# Patient Record
(demographics unavailable — no encounter records)

---

## 2024-10-15 NOTE — HISTORY OF PRESENT ILLNESS
[FreeTextEntry6] : 16yo F presenting for routine Depo-Provera injection. Last Depo given on at [facility] on 06/05/24, no Depo received at outside facilities. No acute medical concerns at today's visit, no acute medical concerns. Patient is not currently sexually active, was last sexually 03/24.   Patient was previously booked to receive Depo on 08/27/24 at [facility], however could not attend because she had work and also felt the Depo shot was making her gain weight. Since discontinuation, she notes that she has continued to gain weight, and her period returned with severe cramping and heavy bleeding.  Patient states she has ongoing routine burning with urination, with one severe episode of painful urination in summer 2023, spontaneous resolution without seeking medical attention  As it has been >3mos since previous Depo shot, bUCG and sign consent to re-start.  Menstrual history: LMP 10/5/24, periods have re-started since gap in Depo, 5 days in duration, 8 pads per day at heaviest, associated symptoms include severe cramping and nausea. PMHx: Depression, previously taking Zoloft but self-discontinued in 3/24. Medications: None. Allergies: NKDA, NKA. Attends therapy regularly.   H: Lives with parents, 15yo sister, 13yo brother, and 11yo brother. Feels safe at home, supportive parents. E: Graduated high school, currently in college M/W/F studying psychology. Did not pay for this semester, wants  A: Enjoys times time with friends and family D: No drug use, no alcohol use. S: No current sexual activity. S: No self-harm, no SI, no thoughts of harming others.  Next Depo shot will be due on 01/07/25; please schedule CPE at this time as well.

## 2025-01-16 NOTE — HISTORY OF PRESENT ILLNESS
[FreeTextEntry1] : 18 year old female on Depo Provera for heavy menstrual bleeding and cramps presenting for Depo injection  - Last Depo injection was 10/15; window (Dec 31 - Jan 14) - 1/1 endorsed uterine bleeding x 6 days - Would like to continue the method however concerned about weight gain  HEADSS: Feels safe at home; Currently working, plans to resume at [employer]; Likes to work and sleep for dung; Denies vaping, cigarettes, heroin, cocaine, ecstasy, Adderall.  Last SA a year ago - not attracted to anyone;  previously SA - no current SI/HI/NSSI has a therapist may be starting medication.

## 2025-01-16 NOTE — ASSESSMENT
[FreeTextEntry1] : 18 year old female with a history of heavy menstrual bleeding and dysmenorrhea here for DMPA injection. Patient overall happy with method except for weight gain.    - POC pregnancy negative; the patient was outside window  - Administered DMPA injection today without any issues; next window (April 2nd - 16th) - Discussed options for alternate options of contraception for heavy menstrual bleeding and dysmenorrhea.  Patient may be interested in [facility] patches in the future.  Denies estrogen contraindications.   - Next visit will be annual well visit

## 2025-01-16 NOTE — REVIEW OF SYSTEMS
Discharge Instructions For Your Heart Catheterization/Stent with Radial/Wrist Site    Activity: For the next 24 hours  Follow these guidelines related to the sedation medicine that you've received.   You must have someone drive you home.  You may feel tired, unsteady, or not quite yourself for the remainder of the day due to the sedation medicine. Your body gets rid of the medicine usually in 24 hours.  Do not drive or operate machinery or power tools.  Do not drink alcohol or smoke.  Do not make any important decisions or sign important papers.    Activity:   Follow these guidelines related to the puncture site in your wrist.  Minimal use of the arm used for the procedure for the remainder of the day. If possible, elevate your arm on a pillow and don't bend your wrist.  No lifting more than 5 pounds for 5 days with the arm used for the procedure.  If you do heavy physical work on your job, follow your doctor's instructions about when you may return to work.  Use ice pack for discomfort.  If you have a wrist immobilizer, remove the following morning.    Procedure Site Care:  Keep the dressing on your procedure site for the remainder of the day. The following day, you may remove the dressing and shower. Gently cleanse the procedure site with soap and water daily and dry well.   Apply a new Band-aid on the puncture site for 1 day;  Do not apply lotions, ointments, powders, or creams to site.   No soaking the wrist in water (i.e., bathtub, hot tub, dish water, pool of water) until the wound has completely healed (3-5 days).    Common side effects you may notice  Soreness at puncture site.  Slight bruising at the site for several days to several weeks.  Lump the size of a pea or marble at the puncture site for a few weeks.    Diet/Fluids  Resume diet as prior to admission.  If you had a catheterization or stent, drink plenty of liquids to help flush the dye used for your procedure out of your body (unless you're on a  fluid restriction ordered by your physician).    Medications  Take mild pain reliever such as Tylenol/Acetaminophen as needed for pain.    Call your doctor with these issues  Bleeding from the procedure site. If significant bleeding occurs or there is a growing lump at your site, apply firm pressure at the site where your dressing is. Call 911 and keep pressure on the site.  Numbness, tingling or color change in the arm used for puncture site.  Increasing pain and firmness near the puncture site.  A temperature greater than 101 degrees.  Redness or drainage around site.      [Nl] : Integumentary [FreeTextEntry3] : Wt gain

## 2025-04-15 NOTE — HISTORY OF PRESENT ILLNESS
[Eats regular meals including adequate fruits and vegetables] : eats regular meals including adequate fruits and vegetables [Has friends] : has friends [Uses safety belts/safety equipment] : uses safety belts/safety equipment  [Yes] : Patient has had sexual intercourse. [Has ways to cope with stress] : has ways to cope with stress [Displays self-confidence] : displays self-confidence [NO] : No [Uses electronic nicotine delivery system] : does not use electronic nicotine delivery system [Uses tobacco] : does not use tobacco [Uses drugs] : does not use drugs  [Drinks alcohol] : does not drink alcohol [Impaired/distracted driving] : no impaired/distracted driving [FreeTextEntry7] : 17 year old Female currently on depo (last shot January 15, 2025). [de-identified] : Interested in switching to the patch because she gained a lot of weight on the shot. Depo got rid of her period completely which she likes. She originally started birth control due to severe cramps. [FreeTextEntry8] : Has not had a period since las appointment in January.  [de-identified] : Lives with mom, dad, 2 brothers and sister. Get along well and she feels safe at home [de-identified] : Plans to go to school next fall or spring semester. Wants do carpentry or mechanical engineering. Currently works as a  at [employer]. [de-identified] : Forgets to eat while working. Eats more vegetables than fruit. Healthy diet encouraged with smaller well rounded more frequent meals.  [de-identified] : Likes to go the park. Hard to find time exercising because of work schedule. [de-identified] : Last had sex a year or two ago. Typically engages in oral and vaginal with a male partner.  [de-identified] : Currently on Zoloft for depression and anxiety - sees a therapist weekly and psychiatrist- feels like they help.

## 2025-04-15 NOTE — REVIEW OF SYSTEMS
[Headache] : headache [Fever] : no fever [Chills] : no chills [Night Sweats] : no night sweats [Sore Throat] : no sore throat [Chest Pain] : no chest pain [Cough] : no cough [Vomiting] : no vomiting [Diarrhea] : no diarrhea [Constipation] : no constipation [Dizziness] : no dizziness [Myalgia] : no myalgia [Back Pain] : no back pain [Dysuria] : no dysuria [Vaginal Dischage] : no vaginal discharge

## 2025-04-15 NOTE — RISK ASSESSMENT
[1] : 2) Feeling down, depressed, or hopeless for several days (1) [Nearly Every Day (3)] : 7.) Trouble concentrating on things, such as reading a newspaper or watching television? Nearly every day [Several Days (1)] : 8.) Moving or speaking so slowly that other people could have noticed, or the opposite, moving or speaking faster than usual? Several days [Not at All (0)] : 9.) Thoughts that you would be off dead or of hurting yourself in some way? Not at all [Moderately Severe] : Severity of Depression is Moderately Severe [Somewhat Difficult] : How difficult have these problems made it for you to do your work, take care of things at home, or get along with people? Somewhat difficult [I have developed a follow-up plan documented below in the note.] : I have developed a follow-up plan documented below in the note. [PHQ-2 Negative - No further assessment needed] : PHQ-2 Negative - No further assessment needed [PHQ-9 Positive] : PHQ-9 Positive [de-identified] : Feels like the symptoms of sleeping/being tired, and appetite are related to work more than depression. [[id number]] : 2 [[id number]] : 15

## 2025-04-15 NOTE — PHYSICAL EXAM
[No Acute Distress] : no acute distress [Normocephalic] : normocephalic [EOMI Bilateral] : EOMI bilateral [Nonerythematous Oropharynx] : nonerythematous oropharynx [Clear to Auscultation Bilaterally] : clear to auscultation bilaterally [Regular Rate and Rhythm] : regular rate and rhythm [Normal S1, S2 audible] : normal S1, S2 audible [No Murmurs] : no murmurs [Soft] : soft [NonTender] : non tender [Non Distended] : non distended

## 2025-04-15 NOTE — DISCUSSION/SUMMARY
[FreeTextEntry1] : Patient is a 16yo F here for a well visit and interesting in switching birth control methods. She is currently on the depo shot (last administered January 15th) but would like to switch to the patch due to excess weight gain on the shot. Her PHQ-9 is positive for moderately severe depression. She is not currently endorsing any HI/SI/NSSI. She is currently on Zoloft and seeing a therapist weekly and a psychiatrist. She will continue to follow-up with them.  She will follow-up in 10 weeks to see how she is doing on the patch.

## 2025-06-13 NOTE — ASSESSMENT
[FreeTextEntry1] : Contraceptive Management : The patient is tolerating the birth control patch well. It has been effective in managing her menstrual symptoms, including reducing heavy bleeding and cramps. She has not experienced significant side effects, and her blood pressure remains within normal limits. The patient has also experienced weight loss since discontinuing Depo-Provera and starting the patch.  BP today within normal limits. - Therapeutic Interventions: Continue with the birth control patch. Prescribed a 6-month supply. - Patient Education: Discussed the continued use of the patch, its effectiveness, and potential side effects to monitor. - Monitoring: Continue to monitor blood pressure at future visits due to estrogen-containing contraception. - Follow-Up: Scheduled for a follow-up visit in 6 months. Patient instructed to return sooner if any concerns arise.

## 2025-06-13 NOTE — HISTORY OF PRESENT ILLNESS
[FreeTextEntry1] : 18 year old female that transitioned from Depo-Provera shot to the birth control patch in April. She reports that the patch has been working well for her. The patient is currently in her week off from the patch and is due for a refill. She states that her primary motivation for using the patch is to manage her menstrual symptoms. Prior to starting contraception, she experienced heavy bleeding and severe cramps during her periods. The Depo-Provera shot had eliminated her periods but caused significant weight gain. The patient switched to the patch on the recommendation that it would be less likely to cause weight gain while still providing menstrual symptom relief. She reports that the patch has been effective in reducing her menstrual flow and cramps. ACHES negative.  Denies break through bleeding. The patient denies any sexual activity since her last visit but has been sexually active in the past. She also mentions being on Zoloft and continuing with therapy and psychiatric care.  Currently working at [employer].